# Patient Record
Sex: MALE | Race: WHITE | Employment: OTHER | URBAN - METROPOLITAN AREA
[De-identification: names, ages, dates, MRNs, and addresses within clinical notes are randomized per-mention and may not be internally consistent; named-entity substitution may affect disease eponyms.]

---

## 2017-03-03 PROBLEM — I70.0 AORTIC ATHEROSCLEROSIS (HCC): Status: ACTIVE | Noted: 2017-03-03

## 2017-03-03 PROBLEM — I77.9 BILATERAL CAROTID ARTERY DISEASE (HCC): Status: ACTIVE | Noted: 2017-03-03

## 2017-03-06 PROBLEM — E78.2 MIXED HYPERLIPIDEMIA: Status: ACTIVE | Noted: 2017-03-06

## 2018-11-05 PROBLEM — D69.2 PURPURA (HCC): Status: ACTIVE | Noted: 2018-11-05

## 2019-01-25 PROBLEM — D69.2 PURPURA (HCC): Status: RESOLVED | Noted: 2018-11-05 | Resolved: 2019-01-25

## 2019-01-25 PROBLEM — I77.9 BILATERAL CAROTID ARTERY DISEASE (HCC): Status: RESOLVED | Noted: 2017-03-03 | Resolved: 2019-01-25

## 2022-03-15 PROBLEM — Z87.2 HISTORY OF ACTINIC KERATOSES: Status: ACTIVE | Noted: 2022-03-15

## 2022-03-15 PROBLEM — M13.0 POLYARTHRITIS: Status: ACTIVE | Noted: 2022-03-15

## 2023-09-20 ENCOUNTER — APPOINTMENT (OUTPATIENT)
Dept: GENERAL RADIOLOGY | Age: 88
End: 2023-09-20
Attending: NURSE PRACTITIONER
Payer: MEDICARE

## 2023-09-20 ENCOUNTER — HOSPITAL ENCOUNTER (OUTPATIENT)
Age: 88
Discharge: HOME | End: 2023-09-20
Payer: MEDICARE

## 2023-09-20 ENCOUNTER — APPOINTMENT (OUTPATIENT)
Dept: CT IMAGING | Age: 88
End: 2023-09-20
Attending: NURSE PRACTITIONER
Payer: MEDICARE

## 2023-09-20 VITALS
OXYGEN SATURATION: 98 % | SYSTOLIC BLOOD PRESSURE: 136 MMHG | TEMPERATURE: 98 F | DIASTOLIC BLOOD PRESSURE: 69 MMHG | RESPIRATION RATE: 20 BRPM | HEART RATE: 92 BPM
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
## 2023-09-20 DIAGNOSIS — S51.002A AVULSION OF SKIN OF LEFT ELBOW, INITIAL ENCOUNTER: ICD-10-CM

## 2023-09-20 PROCEDURE — 99204 OFFICE O/P NEW MOD 45 MIN: CPT

## 2023-09-20 PROCEDURE — 73080 X-RAY EXAM OF ELBOW: CPT | Performed by: NURSE PRACTITIONER

## 2023-09-20 PROCEDURE — 70450 CT HEAD/BRAIN W/O DYE: CPT | Performed by: NURSE PRACTITIONER

## 2023-09-20 PROCEDURE — 99203 OFFICE O/P NEW LOW 30 MIN: CPT

## 2023-09-20 NOTE — ED INITIAL ASSESSMENT (HPI)
Tripped over a curb and fell today. Denies head injury. Large skin tear and abrasions to left elbow. Bleeding controlled with pressure. Last TDaP 2017.

## 2023-09-20 NOTE — DISCHARGE INSTRUCTIONS
Gently wash the wound twice daily with soap and water. Apply antibiotic ointment twice daily. Monitor for signs of infection. Tylenol for any discomfort. Follow-up with your doctor for wound check. Return for any concerns.

## 2023-12-05 ENCOUNTER — IMMUNIZATION (OUTPATIENT)
Dept: LAB | Age: 88
End: 2023-12-05
Attending: EMERGENCY MEDICINE
Payer: MEDICARE

## 2023-12-05 DIAGNOSIS — Z23 NEED FOR VACCINATION: Primary | ICD-10-CM

## 2023-12-05 PROCEDURE — 90480 ADMN SARSCOV2 VAC 1/ONLY CMP: CPT

## 2025-01-15 ENCOUNTER — IMMUNIZATION (OUTPATIENT)
Dept: LAB | Age: OVER 89
End: 2025-01-15
Attending: EMERGENCY MEDICINE
Payer: MEDICARE

## 2025-01-15 DIAGNOSIS — Z23 NEED FOR VACCINATION: Primary | ICD-10-CM

## 2025-01-15 PROCEDURE — 90480 ADMN SARSCOV2 VAC 1/ONLY CMP: CPT
